# Patient Record
Sex: MALE | Race: WHITE | NOT HISPANIC OR LATINO | Employment: FULL TIME | URBAN - METROPOLITAN AREA
[De-identification: names, ages, dates, MRNs, and addresses within clinical notes are randomized per-mention and may not be internally consistent; named-entity substitution may affect disease eponyms.]

---

## 2021-07-28 ENCOUNTER — HOSPITAL ENCOUNTER (EMERGENCY)
Facility: HOSPITAL | Age: 46
Discharge: HOME | End: 2021-07-28
Attending: EMERGENCY MEDICINE | Admitting: EMERGENCY MEDICINE

## 2021-07-28 VITALS
SYSTOLIC BLOOD PRESSURE: 214 MMHG | TEMPERATURE: 98 F | BODY MASS INDEX: 42.56 KG/M2 | DIASTOLIC BLOOD PRESSURE: 117 MMHG | HEIGHT: 71 IN | RESPIRATION RATE: 20 BRPM | HEART RATE: 99 BPM | WEIGHT: 304 LBS | OXYGEN SATURATION: 97 %

## 2021-07-28 DIAGNOSIS — K04.7 DENTAL ABSCESS: ICD-10-CM

## 2021-07-28 DIAGNOSIS — L03.211 FACIAL CELLULITIS: ICD-10-CM

## 2021-07-28 PROCEDURE — 99284 EMERGENCY DEPT VISIT MOD MDM: CPT

## 2021-07-28 PROCEDURE — 96372 THER/PROPH/DIAG INJ SC/IM: CPT | Performed by: EMERGENCY MEDICINE

## 2021-07-28 PROCEDURE — 250N000013 HC RX MED GY IP 250 OP 250 PS 637: Performed by: EMERGENCY MEDICINE

## 2021-07-28 PROCEDURE — 250N000011 HC RX IP 250 OP 636: Performed by: EMERGENCY MEDICINE

## 2021-07-28 RX ORDER — CLINDAMYCIN HCL 300 MG
300 CAPSULE ORAL 3 TIMES DAILY
Qty: 30 CAPSULE | Refills: 0 | Status: SHIPPED | OUTPATIENT
Start: 2021-07-28 | End: 2021-08-07

## 2021-07-28 RX ORDER — IBUPROFEN 800 MG/1
800 TABLET, FILM COATED ORAL EVERY 8 HOURS PRN
Qty: 24 TABLET | Refills: 0 | Status: SHIPPED | OUTPATIENT
Start: 2021-07-28 | End: 2021-08-05

## 2021-07-28 RX ORDER — OXYCODONE HYDROCHLORIDE 5 MG/1
5 TABLET ORAL EVERY 6 HOURS PRN
Qty: 4 TABLET | Refills: 0 | Status: SHIPPED | OUTPATIENT
Start: 2021-07-28 | End: 2021-07-31

## 2021-07-28 RX ORDER — KETOROLAC TROMETHAMINE 30 MG/ML
30 INJECTION, SOLUTION INTRAMUSCULAR; INTRAVENOUS ONCE
Status: COMPLETED | OUTPATIENT
Start: 2021-07-28 | End: 2021-07-28

## 2021-07-28 RX ORDER — CLINDAMYCIN HCL 150 MG
300 CAPSULE ORAL ONCE
Status: COMPLETED | OUTPATIENT
Start: 2021-07-28 | End: 2021-07-28

## 2021-07-28 RX ADMIN — KETOROLAC TROMETHAMINE 30 MG: 30 INJECTION, SOLUTION INTRAMUSCULAR at 04:09

## 2021-07-28 RX ADMIN — CLINDAMYCIN HYDROCHLORIDE 300 MG: 150 CAPSULE ORAL at 04:09

## 2021-07-28 ASSESSMENT — MIFFLIN-ST. JEOR: SCORE: 2291.06

## 2021-07-28 ASSESSMENT — ENCOUNTER SYMPTOMS: FACIAL SWELLING: 1

## 2021-07-28 NOTE — DISCHARGE INSTRUCTIONS
Take antibiotics as prescribed.  You will also need to follow-up with dentistry as you will likely need the tooth removed.  Please return if you are not having any improvement despite antibiotics.

## 2021-07-28 NOTE — ED PROVIDER NOTES
EMERGENCY DEPARTMENT ENCOUNTER      NAME: Uvaldo Workman  AGE: 45 year old male  YOB: 1975  MRN: [id number]  EVALUATION DATE & TIME: No admission date for patient encounter.    PCP: Sudarshan Acevedo    ED PROVIDER: Leora Bhakta M.D.      Chief Complaint   Patient presents with     Oral Swelling     Wound Infection         FINAL IMPRESSION:  1. Dental abscess    2. Facial cellulitis        MEDICAL DECISION MAKING:    Pertinent Labs & Imaging studies reviewed. (See chart for details)  ED Course as of Jul 28 0414 Wed Jul 28, 2021   0354 Febrile.  Vital signs here with hypertension likely secondary to discomfort from facial swelling.  Patient is coming in for evaluation of facial swelling, cellulitis and broken tooth.        Exam here with significantly poor dentition but has tooth #10 broken off at gumline with abscess formation and redness but without any obvious fluctuance.  Has surrounding facial swelling and evidence of facial cellulitis as well.  No fevers or chills.  No difficulty eating or drinking.  No voice changes.  No tenderness or swelling in the floor of his mouth.  No trismus.Patient here with likely decaying, abscessed tooth with surrounding facial cellulitis.  Initially he was concerned as he thought he had a large abscess in his face.  Was discussion that this is likely spread from his tooth.  At this point patient did drive, will give a injection of Toradol, a dose of clindamycin here for abscess.  Will discharge with antibiotics, Motrin and few doses of oxycodone for breakthrough pain.  Was injected 1 swelling starts to go down he needs to make an appointment urgently with his dentist to have this tooth removed.  Otherwise discussed return precautions.  Patient was in agreement with plan.              Critical care: 0 minutes excluding separately billable procedures.  Includes bedside management, time reviewing test results, review of records, discussing the case with staff,  documenting the medical record and time spent with family members (or surrogate decision makers) discussing specific treatment issues.          ED COURSE:  3:48 AM I met with the patient for the initial interview and physical examination. Discussed plan for treatment and workup in the ED.      PPE: Provider wore gloves, N95 mask, eye protection, surgical cap, and paper mask.    The importance of close follow up was discussed. We reviewed warning signs and symptoms, and I instructed Mr. Workman to return to the emergency department immediately if he develops any new or worsening symptoms. I provided additional verbal discharge instructions. Mr. Workman expressed understanding and agreement with this plan of care, his questions were answered, and he was discharged in stable condition.     MEDICATIONS GIVEN IN THE EMERGENCY:  Medications   ketorolac (TORADOL) injection 30 mg (30 mg Intramuscular Given 7/28/21 0409)   clindamycin (CLEOCIN) capsule 300 mg (300 mg Oral Given 7/28/21 0409)       NEW PRESCRIPTIONS STARTED AT TODAY'S ER VISIT:  New Prescriptions    CLINDAMYCIN (CLEOCIN) 300 MG CAPSULE    Take 1 capsule (300 mg) by mouth 3 times daily for 10 days    IBUPROFEN (ADVIL/MOTRIN) 800 MG TABLET    Take 1 tablet (800 mg) by mouth every 8 hours as needed for moderate pain    OXYCODONE (ROXICODONE) 5 MG TABLET    Take 1 tablet (5 mg) by mouth every 6 hours as needed for breakthrough pain          =================================================================    HPI    Patient information was obtained from: the patient    Use of : N/A       Uvaldo Workman is a 45 year old male who presents for evaluation of oral swelling and wound infection.    The patient has had oral swelling for the past couple days. He states that it has progressively worsened and swelling has spread throughout his face. The patient has a bad tooth that seems to be the root cause of the swelling. He states that he is unable to sleep due to  the pain.    REVIEW OF SYSTEMS   Review of Systems   HENT: Positive for facial swelling.    All other systems reviewed and are negative.    PAST MEDICAL HISTORY:  No past medical history on file.    PAST SURGICAL HISTORY:  No past surgical history on file.    CURRENT MEDICATIONS:    No current facility-administered medications for this encounter.    Current Outpatient Medications:      clindamycin (CLEOCIN) 300 MG capsule, Take 1 capsule (300 mg) by mouth 3 times daily for 10 days, Disp: 30 capsule, Rfl: 0     ibuprofen (ADVIL/MOTRIN) 800 MG tablet, Take 1 tablet (800 mg) by mouth every 8 hours as needed for moderate pain, Disp: 24 tablet, Rfl: 0     oxyCODONE (ROXICODONE) 5 MG tablet, Take 1 tablet (5 mg) by mouth every 6 hours as needed for breakthrough pain, Disp: 4 tablet, Rfl: 0     lisinopril-hydrochlorothiazide (PRINZIDE,ZESTORETIC) 20-25 mg per tablet, [LISINOPRIL-HYDROCHLOROTHIAZIDE (PRINZIDE,ZESTORETIC) 20-25 MG PER TABLET] Take 1 tablet by mouth daily., Disp: 30 tablet, Rfl: 0    ALLERGIES:  No Known Allergies    FAMILY HISTORY:  No family history on file.    SOCIAL HISTORY:   Social History     Socioeconomic History     Marital status:      Spouse name: Not on file     Number of children: Not on file     Years of education: Not on file     Highest education level: Not on file   Occupational History     Not on file   Tobacco Use     Smoking status: Never Smoker   Substance and Sexual Activity     Alcohol use: Not Currently     Drug use: Yes     Types: Marijuana     Sexual activity: Not on file   Other Topics Concern     Not on file   Social History Narrative     Not on file     Social Determinants of Health     Financial Resource Strain:      Difficulty of Paying Living Expenses:    Food Insecurity:      Worried About Running Out of Food in the Last Year:      Ran Out of Food in the Last Year:    Transportation Needs:      Lack of Transportation (Medical):      Lack of Transportation (Non-Medical):  "   Physical Activity:      Days of Exercise per Week:      Minutes of Exercise per Session:    Stress:      Feeling of Stress :    Social Connections:      Frequency of Communication with Friends and Family:      Frequency of Social Gatherings with Friends and Family:      Attends Mosque Services:      Active Member of Clubs or Organizations:      Attends Club or Organization Meetings:      Marital Status:    Intimate Partner Violence:      Fear of Current or Ex-Partner:      Emotionally Abused:      Physically Abused:      Sexually Abused:        PHYSICAL EXAM:    Vitals: BP (!) 214/117   Pulse 99   Temp 98  F (36.7  C)   Resp 20   Ht 1.803 m (5' 11\")   Wt 137.9 kg (304 lb)   SpO2 97%   BMI 42.40 kg/m     General:. Alert and interactive, comfortable appearing.  HENT: Oropharynx without erythema or exudates. MMM.  TMs clear bilaterally. Significantly poor dentition but has tooth #10 broken off at gumline with abscess formation and redness but without any obvious fluctuance. Has surrounding facial swelling and evidence of facial cellulitis. No tenderness or swelling in the floor of his mouth. No trismus.  Eyes: Pupils mid-sized and equally reactive.   Neck: Full AROM.  No midline tenderness to palpation.  Cardiovascular: Regular rate and rhythm. Peripheral pulses 2+ bilaterally.  Chest/Pulmonary: Normal work of breathing. Lung sounds clear and equal throughout, no wheezes or crackles. No chest wall tenderness or deformities.  Abdomen: Soft, nondistended. Nontender without guarding or rebound.  Skin: Warm and dry. Normal skin color.   Neuro: Speech clear. CNs grossly intact. Moves all extremities appropriately. Strength and sensation grossly intact to all extremities.   Psych: Normal affect/mood, cooperative, memory appropriate.     LAB:  All pertinent labs reviewed and interpreted.  Labs Ordered and Resulted from Time of ED Arrival Up to the Time of Departure from the ED - No data to " display    RADIOLOGY:  No orders to display     I, Laquita Marquez, am serving as a scribe to document services personally performed by Dr. Leora Bhakta  based on my observation and the provider's statements to me. I, Leora Bhakta MD attest that Laquita Marquez is acting in a scribe capacity, has observed my performance of the services and has documented them in accordance with my direction.      Leora Bhakta M.D.  Emergency Medicine  [facility] EMERGENCY DEPARTMENT  [address] [place] [id number]  [phone]  Dept: [phone]     Susy Bhakta MD  07/28/21 0416

## 2021-07-28 NOTE — Clinical Note
Uvaldo Workman was seen and treated in our emergency department on 7/28/2021.  He may return to work on 07/30/2021.  Seen in the emergency department.     If you have any questions or concerns, please don't hesitate to call.      Susy Bhakta MD

## 2024-03-15 ENCOUNTER — HOSPITAL ENCOUNTER (EMERGENCY)
Facility: HOSPITAL | Age: 49
Discharge: HOME | End: 2024-03-15
Attending: EMERGENCY MEDICINE | Admitting: EMERGENCY MEDICINE

## 2024-03-15 VITALS
HEIGHT: 71 IN | BODY MASS INDEX: 37.8 KG/M2 | HEART RATE: 99 BPM | WEIGHT: 270 LBS | TEMPERATURE: 98.6 F | SYSTOLIC BLOOD PRESSURE: 147 MMHG | DIASTOLIC BLOOD PRESSURE: 96 MMHG | RESPIRATION RATE: 18 BRPM | OXYGEN SATURATION: 96 %

## 2024-03-15 DIAGNOSIS — K04.7 DENTAL ABSCESS: ICD-10-CM

## 2024-03-15 LAB — GLUCOSE BLDC GLUCOMTR-MCNC: 140 MG/DL (ref 70–99)

## 2024-03-15 PROCEDURE — 96372 THER/PROPH/DIAG INJ SC/IM: CPT | Performed by: EMERGENCY MEDICINE

## 2024-03-15 PROCEDURE — 250N000013 HC RX MED GY IP 250 OP 250 PS 637: Performed by: EMERGENCY MEDICINE

## 2024-03-15 PROCEDURE — 82962 GLUCOSE BLOOD TEST: CPT

## 2024-03-15 PROCEDURE — 99284 EMERGENCY DEPT VISIT MOD MDM: CPT | Mod: 25

## 2024-03-15 PROCEDURE — 250N000011 HC RX IP 250 OP 636: Performed by: EMERGENCY MEDICINE

## 2024-03-15 RX ORDER — KETOROLAC TROMETHAMINE 30 MG/ML
30 INJECTION, SOLUTION INTRAMUSCULAR; INTRAVENOUS ONCE
Status: COMPLETED | OUTPATIENT
Start: 2024-03-15 | End: 2024-03-15

## 2024-03-15 RX ORDER — IBUPROFEN 600 MG/1
600 TABLET, FILM COATED ORAL EVERY 6 HOURS PRN
Qty: 20 TABLET | Refills: 0 | Status: SHIPPED | OUTPATIENT
Start: 2024-03-15

## 2024-03-15 RX ORDER — OXYCODONE HYDROCHLORIDE 5 MG/1
5 TABLET ORAL EVERY 6 HOURS PRN
Qty: 4 TABLET | Refills: 0 | Status: SHIPPED | OUTPATIENT
Start: 2024-03-15 | End: 2024-03-18

## 2024-03-15 RX ADMIN — AMOXICILLIN AND CLAVULANATE POTASSIUM 1 TABLET: 875; 125 TABLET, FILM COATED ORAL at 22:39

## 2024-03-15 RX ADMIN — KETOROLAC TROMETHAMINE 30 MG: 30 INJECTION, SOLUTION INTRAMUSCULAR at 22:39

## 2024-03-15 ASSESSMENT — COLUMBIA-SUICIDE SEVERITY RATING SCALE - C-SSRS
6. HAVE YOU EVER DONE ANYTHING, STARTED TO DO ANYTHING, OR PREPARED TO DO ANYTHING TO END YOUR LIFE?: NO
2. HAVE YOU ACTUALLY HAD ANY THOUGHTS OF KILLING YOURSELF IN THE PAST MONTH?: NO
1. IN THE PAST MONTH, HAVE YOU WISHED YOU WERE DEAD OR WISHED YOU COULD GO TO SLEEP AND NOT WAKE UP?: NO

## 2024-03-15 ASSESSMENT — ACTIVITIES OF DAILY LIVING (ADL)
ADLS_ACUITY_SCORE: 33
ADLS_ACUITY_SCORE: 33

## 2024-03-16 NOTE — ED PROVIDER NOTES
Emergency Department Encounter      NAME: Uvaldo Workman  AGE: 48 year old male  YOB: 1975  MRN: [id number]  EVALUATION DATE & TIME: 3/15/2024 10:00 PM    PCP: System, Provider Not In    ED PROVIDER: Jesús Mclean M.D.      Chief Complaint   Patient presents with    Dental Pain         FINAL IMPRESSION:  1. Dental abscess        MEDICAL DECISION MAKING:    10:21 PM I met with the patient, obtained history, performed an initial exam, and discussed options and plan for diagnostics and treatment here in the ED. We discussed plan to discharge. Patient is in agreement with the plan.    This patient is a 48-year-old male who presents with tooth pain.  The patient states that he was recently diagnosed with diabetes after being admitted with DKA.  I reviewed the patient medical records and he was  seen at the urgency room with Dr. Eldridge on 27 January where he was diagnosed with diverticulitis.  He has had increasing pain in the left upper jaw around one of his teeth.  He does have an appointment with the dentist on Tuesday but says the pain was severe enough that he required coming to the ER.  On exam he appeared to be suffering from a left upper canine dental abscess.  The patient's pain was treated with Toradol in the ER.  We given initial dose of Augmentin in the ER and he was discharged home with a prescription for Augmentin as well as ibuprofen and oxycodone for the pain.  He will be seen at dentist within 48 hours.    Pertinent Labs & Imaging studies reviewed. (See chart for details)    The importance of close follow up was discussed. We reviewed warning signs and symptoms, and I instructed Mr. Workman to return to the emergency department immediately if he develops any new or worsening symptoms. I provided additional verbal discharge instructions. Mr. Workman expressed understanding and agreement with this plan of care, his questions were answered, and he was discharged in stable condition.      Medical Decision Making     History:  Supplemental history from: Documented in chart, if applicable  External Record(s) reviewed: Documented in chart, if applicable.     Work Up:  Chart documentation includes differential considered and any EKGs or imaging independently interpreted by provider, where specified.  In additional to work up documented, I considered the following work up: Documented in chart, if applicable.     External consultation:  Discussion of management with another provider: Documented in chart, if applicable     Complicating factors:  Care impacted by chronic illness: Diabetes, Hypertension  Care affected by social determinants of health: Access to Medical Care     Disposition considerations: Discharge. I prescribed additional medications.      MEDICATIONS GIVEN IN THE EMERGENCY:  Medications   amoxicillin-clavulanate (AUGMENTIN) 875-125 MG per tablet 1 tablet (has no administration in time range)   ketorolac (TORADOL) injection 30 mg (has no administration in time range)       NEW PRESCRIPTIONS STARTED AT TODAY'S ER VISIT:  New Prescriptions    AMOXICILLIN-CLAVULANATE (AUGMENTIN) 875-125 MG TABLET    Take 1 tablet by mouth 2 times daily for 7 days    IBUPROFEN (ADVIL/MOTRIN) 600 MG TABLET    Take 1 tablet (600 mg) by mouth every 6 hours as needed for moderate pain    OXYCODONE (ROXICODONE) 5 MG TABLET    Take 1 tablet (5 mg) by mouth every 6 hours as needed for pain          =================================================================    HPI    Patient information was obtained from: Patient    Use of : N/A       Uvaldo Workman is a 48 year old male with a past medical history of hypertension and DM2, who presents to the ED by private car, with a concern of dental pain.     Patient reports an onset of dental abscess in his left upper canine. His abscess alleviated with a clindamycin course. His abscess redeveloped and now endorses swelling with pain. Patient attempted to self  "drain his tooth with a needle. He has an upcoming dental appointment on 3/19 (~4 days from now). His dentist referred him to the ED to get antibiotics before being seen. No other medical concerns are expressed at this time.     REVIEW OF SYSTEMS   Review of Systems   HENT:  Positive for dental problem (left upper canine abscess, swelling, and tenderness).    All other systems reviewed and are negative.     PAST MEDICAL HISTORY:  History reviewed. No pertinent past medical history.    PAST SURGICAL HISTORY:  History reviewed. No pertinent surgical history.    CURRENT MEDICATIONS:      Current Facility-Administered Medications:     amoxicillin-clavulanate (AUGMENTIN) 875-125 MG per tablet 1 tablet, 1 tablet, Oral, Once, Jesús Mclean MD    ketorolac (TORADOL) injection 30 mg, 30 mg, Intramuscular, Once, Jesús Mclean MD    Current Outpatient Medications:     amoxicillin-clavulanate (AUGMENTIN) 875-125 MG tablet, Take 1 tablet by mouth 2 times daily for 7 days, Disp: 14 tablet, Rfl: 0    ibuprofen (ADVIL/MOTRIN) 600 MG tablet, Take 1 tablet (600 mg) by mouth every 6 hours as needed for moderate pain, Disp: 20 tablet, Rfl: 0    oxyCODONE (ROXICODONE) 5 MG tablet, Take 1 tablet (5 mg) by mouth every 6 hours as needed for pain, Disp: 4 tablet, Rfl: 0    lisinopril-hydrochlorothiazide (PRINZIDE,ZESTORETIC) 20-25 mg per tablet, [LISINOPRIL-HYDROCHLOROTHIAZIDE (PRINZIDE,ZESTORETIC) 20-25 MG PER TABLET] Take 1 tablet by mouth daily., Disp: 30 tablet, Rfl: 0    ALLERGIES:  No Known Allergies    FAMILY HISTORY:  History reviewed. No pertinent family history.    SOCIAL HISTORY:   Social History     Socioeconomic History    Marital status:    Tobacco Use    Smoking status: Never   Substance and Sexual Activity    Alcohol use: Not Currently    Drug use: Yes     Types: Marijuana       PHYSICAL EXAM:    Vitals: BP (!) 147/96   Pulse 99   Temp 98.6  F (37  C) (Oral)   Resp 18   Ht 1.803 m (5' 11\")   Wt 122.5 " kg (270 lb)   SpO2 96%   BMI 37.66 kg/m     Constitutional: Well developed, well nourished. Comfortable appearing.  HEAD:Normocephalic, atraumatic, mild swelling of the left upper cheek.   Eyes: PERRLA, EOM intact, conjunctiva clear, no discharge  ENT: mucous membranes moist, nose normal. Left upper canine only root is present. There is surrounding erythema, swelling, and tenderness. There is a large dental carry in the left upper premolar as well.   Neck- Supple, gross ROM intact.  No JVD.  No palpable nodes.  Pulmonary: Clear to auscultation bilaterally, no respiratory distress, no wheezing, speaks full sentences easily.  Chest: No chest wall tenderness  Cardiovascular: Normal heart rate, regular rhythm, no murmurs. No lower extremity edema, 2+ DP pulses.      LAB:  All pertinent labs reviewed and interpreted.  Labs Ordered and Resulted from Time of ED Arrival to Time of ED Departure   GLUCOSE BY METER - Abnormal       Result Value    GLUCOSE BY METER POCT 140 (*)    GLUCOSE MONITOR NURSING POCT       RADIOLOGY:  No orders to display       PROCEDURES:   Procedures       I, Panda Maravilla, am serving as a scribe to document services personally performed by Dr. Jesús Mclean based on my observation and the provider's statements to me. I, Jesús Mclean M.D. attest that Panda Maravilla is acting in a scribe capacity, has observed my performance of the services and has documented them in accordance with my direction.      Jesús Mclean M.D.  Emergency Medicine  [facility] EMERGENCY DEPARTMENT  [facility] [facility] [address]  [phone]  Dept: [phone]     Jesús Mclean MD  04/23/24 2037

## 2024-03-16 NOTE — ED TRIAGE NOTES
Pt here d/t left upper jaw pain. Excessive tooth decay, swelling, and redness noted at site.   Last week was diagnosed with DM. Was in the hospital with DKA. Has Dentist tamara on Tuesday to fix the issue, but the pain is increasing.  VSS. Afebrile     Triage Assessment (Adult)       Row Name 03/15/24 7159          Triage Assessment    Airway WDL WDL        Respiratory WDL    Respiratory WDL WDL        Skin Circulation/Temperature WDL    Skin Circulation/Temperature WDL WDL